# Patient Record
Sex: FEMALE | Race: BLACK OR AFRICAN AMERICAN
[De-identification: names, ages, dates, MRNs, and addresses within clinical notes are randomized per-mention and may not be internally consistent; named-entity substitution may affect disease eponyms.]

---

## 2019-03-15 ENCOUNTER — HOSPITAL ENCOUNTER (EMERGENCY)
Dept: HOSPITAL 62 - ER | Age: 19
Discharge: HOME | End: 2019-03-15
Payer: COMMERCIAL

## 2019-03-15 VITALS — DIASTOLIC BLOOD PRESSURE: 86 MMHG | SYSTOLIC BLOOD PRESSURE: 123 MMHG

## 2019-03-15 DIAGNOSIS — K62.5: ICD-10-CM

## 2019-03-15 DIAGNOSIS — K64.8: Primary | ICD-10-CM

## 2019-03-15 PROCEDURE — 99282 EMERGENCY DEPT VISIT SF MDM: CPT

## 2019-03-15 NOTE — ER DOCUMENT REPORT
ED General





- General


Chief Complaint: Rectal Bleeding


Stated Complaint: BLOOD IN STOOL


Time Seen by Provider: 03/15/19 17:01


TRAVEL OUTSIDE OF THE [place] IN LAST 30 DAYS: No





- HPI


Notes: 





Patient is a 19-year-old female that presents to the emergency department for 

chief complaint of hemorrhoid.


Patient states she noticed a bump on her rectum yesterday.  She denies any pain 

or bleeding in the area.  She did have a vaginal delivery last year and states 

she had similar issues with bleeding after her delivery.  She states she does 

have bowel movements almost every day but they are firm.  She denies any recent 

constipation or diarrhea.  She denies any abdominal pain, fever, nausea and 

vomiting.  Patient states she is concerned because she looked on [employer] and 

wants to make sure it is not a mass.





Past Medical History: Negative





Past Surgical History: Negative





Social History: Reviewed in chart





Family History: Reviewed and noncontributory for presenting illness





Allergies: Reviewed, see documented allergy list.








REVIEW OF SYSTEMS:





CONSTITUTIONAL : 





No fever





No chills





No diaphoresis





No recent illness





EENT:





No vision changes





No congestion





No sore throat  





CARDIOVASCULAR:





No chest pain





No palpitations





RESPIRATORY:





No shortness of breath





No cough





No difficulty breathing





GASTROINTESTINAL: 





No abdominal pain





No nausea





No vomiting





No diarrhea





GENITOURINARY:





No dysuria





No hematuria





No difficulty urinating





Rectal lump





MUSCULOSKELETAL:





No back pain





No leg pain





No arm pain





SKIN:  





No rashes





No lesions





LYMPHATIC: 





No swollen, enlarged glands.





NEUROLOGICAL: 





No lightheadedness





No headache





No weakness





No paresthesias





PSYCHIATRIC:





No anxiety





No depression 








PHYSICAL EXAMINATION:





Vital signs reviewed, nursing noted reviewed.





GENERAL: Well-appearing, well-nourished and in no acute distress.





HEAD: Atraumatic, normocephalic.





EYES: Eyes appear normal, extraocular movements intact, sclera anicteric, 

conjunctiva are normal.





ENT: nares patent, oropharynx clear without exudates.  Moist mucous membranes.





NECK: Normal range of motion, supple without lymphadenopathy





LUNGS: Breath sounds clear to auscultation bilaterally and equal.  No wheezes 

rales or rhonchi.





HEART: Regular rate and rhythm without murmurs





ABDOMEN: Soft, nontender, normoactive bowel sounds.  No rebound, guarding, or 

rigidity. No masses appreciated.





: Small nonthrombosed nonbleeding external hemorrhoid with minimal tenderness 

to palpation.  Normal rectal tone.





EXTREMITIES: Nontender, good range of motion, no pitting or edema. 





NEUROLOGICAL: No focal neurological deficits. Moves all extremities 

spontaneously Motor and sensory grossly intact on exam.





PSYCH: Normal mood, normal affect.





SKIN: Warm, Dry, normal turgor, no rashes or lesions noted on exposed skin











- Related Data


Allergies/Adverse Reactions: 


                                        





ibuprofen Allergy (Verified 03/15/19 13:59)


   


Penicillins Allergy (Verified 03/15/19 13:59)


   


fish Allergy (Uncoded 03/15/19 13:59)


   











Past Medical History





- Social History


Smoking Status: Never Smoker


Family History: Reviewed & Not Pertinent





Physical Exam





- Vital signs


Vitals: 





                                        











Temp Pulse Resp BP Pulse Ox


 


 99.2 F   93   16   120/81   100 


 


 03/15/19 14:15  03/15/19 14:15  03/15/19 14:15  03/15/19 14:15  03/15/19 14:15














Course





- Re-evaluation


Re-evalutation: 





03/15/19 17:10


Vitals reviewed.  Nursing notes reviewed.  Patient has a small nonbleeding and 

nonthrombosed external hemorrhoid.  I counseled her on sits baths as well as 

over-the-counter hemorrhoid creams.  She will be started on Colace.  She was 

counseled on return precautions and care if the area begins to bleed.  She is 

stable at discharge.





- Vital Signs


Vital signs: 





                                        











Temp Pulse Resp BP Pulse Ox


 


 99.2 F   93   16   120/81   100 


 


 03/15/19 14:15  03/15/19 14:15  03/15/19 14:15  03/15/19 14:15  03/15/19 14:15














Discharge





- Discharge


Clinical Impression: 


Hemorrhoid


Qualifiers:


 Hemorrhoid type: other Qualified Code(s): K64.8 - Other hemorrhoids





Condition: Stable


Disposition: HOME, SELF-CARE


Instructions:  Hemorrhoids ([facility])


Additional Instructions: 


Please return to the emergency department if you have any worsening, or concern 

of your symptoms.





Please return to the emergency department if you develop chest pain, difficulty 

breathing, severe abdominal pain, or ongoing vomiting.





Please follow-up with your primary care physician in 2-3 days and any other 

recommended physicians.





If prescribed, take all medications as directed. 





If you have any questions or concerns do not hesitate to return the emergency 

department for evaluation.





Return to the emergency room if your hemorrhoid becomes firm and has more pain





Take warm baths for 15 minutes at a time to soak the hemorrhoid and warm water 

twice daily





Use a stool softener to obtain a soft bowel movement to prevent bleeding





If the area bleeds hold pressure on the area until the bleeding stops





Use over-the-counter preparation H or name brand hemorrhoid cream to help shrink

the hemorrhoid.





Prescriptions: 


Docusate Sodium [Colace] 100 mg PO DAILY #30 capsule


Referrals: 


[facility] [Provider Group] - Follow up as needed

## 2019-10-11 ENCOUNTER — HOSPITAL ENCOUNTER (EMERGENCY)
Dept: HOSPITAL 62 - ER | Age: 19
Discharge: HOME | End: 2019-10-11
Payer: COMMERCIAL

## 2019-10-11 VITALS — SYSTOLIC BLOOD PRESSURE: 128 MMHG | DIASTOLIC BLOOD PRESSURE: 88 MMHG

## 2019-10-11 DIAGNOSIS — N76.0: Primary | ICD-10-CM

## 2019-10-11 DIAGNOSIS — B96.89: ICD-10-CM

## 2019-10-11 LAB
APPEARANCE UR: CLEAR
APTT PPP: YELLOW S
BACTERIA (WET MOUNT): (no result)
BILIRUB UR QL STRIP: NEGATIVE
CHLAM PCR: NOT DETECTED
EPITHELIALS (WET MOUNT): (no result)
GLUCOSE UR STRIP-MCNC: NEGATIVE MG/DL
KETONES UR STRIP-MCNC: NEGATIVE MG/DL
NITRITE UR QL STRIP: NEGATIVE
PH UR STRIP: 7 [PH] (ref 5–9)
PROT UR STRIP-MCNC: NEGATIVE MG/DL
RBCS (WET MOUNT): (no result)
SP GR UR STRIP: 1.01
T.VAGINALIS (WET MOUNT): (no result)
UROBILINOGEN UR-MCNC: NEGATIVE MG/DL (ref ?–2)
WBCS (WET MOUNT): (no result)
YEAST (WET MOUNT): (no result)

## 2019-10-11 PROCEDURE — 81001 URINALYSIS AUTO W/SCOPE: CPT

## 2019-10-11 PROCEDURE — 87210 SMEAR WET MOUNT SALINE/INK: CPT

## 2019-10-11 PROCEDURE — 87591 N.GONORRHOEAE DNA AMP PROB: CPT

## 2019-10-11 PROCEDURE — 99283 EMERGENCY DEPT VISIT LOW MDM: CPT

## 2019-10-11 PROCEDURE — 87491 CHLMYD TRACH DNA AMP PROBE: CPT

## 2019-10-11 NOTE — ER DOCUMENT REPORT
HPI





- HPI


Patient complains to provider of: Vaginal irritation


Time Seen by Provider: 10/11/19 18:48


Onset: Other - A few days


Quality of pain: No pain


Pain Level: Denies


Context: 





19-year-old female with no reported history reports that her vaginal area is red

and swollen.  She reports she may be having an allergic reaction to a new 

detergent she is using.  She does admit to have vaginal discharge that is white.

 Denies itchiness.  Denies vomiting fever diarrhea.  Denies pain with void.  

Denies vaginal bleeding.  Reports she is not sexually active.  Last menstrual 

period was in September.


Associated Symptoms: None


Exacerbated by: Denies


Relieved by: Denies


Similar symptoms previously: No


Recently seen / treated by doctor: No





- CONSTITUTIONAL


Constitutional: DENIES: Fever, Chills





- REPRODUCTIVE


Reproductive: DENIES: Pregnant:





Past Medical History





- General


Information source: Patient


Last Menstrual Period: September





- Social History


Smoking Status: Never Smoker


Cigarette use (# per day): No


Frequency of alcohol use: Rare


Drug Abuse: None


Family History: Reviewed & Not Pertinent


Patient has suicidal ideation: No


Patient has homicidal ideation: No





- Medical History


Medical History: Negative


Renal/ Medical History: Denies: Hx Peritoneal Dialysis


Surgical Hx: Negative





Vertical Provider Document





- CONSTITUTIONAL


Agree With Documented VS: Yes


Exam Limitations: No Limitations


General Appearance: WD/WN, No Apparent Distress





- INFECTION CONTROL


TRAVEL OUTSIDE OF THE [place] IN LAST 30 DAYS: No





- HEENT


HEENT: Atraumatic, Normocephalic





- NECK


Neck: Normal Inspection, Supple





- RESPIRATORY


Respiratory: Breath Sounds Normal, No Respiratory Distress





- CARDIOVASCULAR


Cardiovascular: Regular Rate, Regular Rhythm





- GI/ABDOMEN


Gastrointestinal: Abdomen Soft, Abdomen Non-Tender





- REPRODUCTIVE


Female Genitalia: Normal Inspection - No erythema no swelling no open sores





- BACK


Back: Normal Inspection





- MUSCULOSKELETAL/EXTREMETIES


Musculoskeletal/Extremeties: MAEW, FROM, Non-Tender





- NEURO


Level of Consciousness: Awake, Alert, Appropriate


Motor/Sensory: No Motor Deficit





- DERM


Integumentary: Warm, Dry





Course





- Re-evaluation


Re-evalutation: 





10/11/19 19:10


19-year-old female that presents emergency department with vaginal irritation.  

Reports she is not sexually active.  Reports some vaginal discharge that is 

white.  She is concerned that she may be having allergic reaction to her new 

detergent.  Denies fever vomiting diarrhea.  Denies pain with void.  Denies 

history of STDs.


10/11/19 19:41


UA shows leukocytes, few WBCs.  Patient denies dysuria denies pain with voiding 

denies urinary frequency.  Wet mount shows bacterial vaginosis no yeast.  

Patient reports there is no way she can have STD she is not sexually active.  

Will discharge patient with prescription for Flagyl and instructed to follow-up 

with her GYN or the health department within the next week for recheck.  We will

also contact patient for treatment should her STD cultures come back positive.


10/11/19 21:02


STD cultures negative





- Vital Signs


Vital signs: 


                                        











Temp Pulse Resp BP Pulse Ox


 


 98.3 F   84   18   130/89 H  100 


 


 10/11/19 18:12  10/11/19 18:12  10/11/19 18:12  10/11/19 18:12  10/11/19 18:12














Procedures





- Pelvic Exam


  ** Pelvic exam


Time completed: 19:10


Cultures obtained: Yes


Wet prep obtained: Yes


Herpes culture obtained: No


POC sent to lab: No


Foreign body removed: No


Bimanual exam performed: Yes


Witnessed by: Mariano PORTER





Discharge





- Discharge


Clinical Impression: 


 Vaginal irritation, Bacterial vaginosis





Condition: Stable


Disposition: HOME, SELF-CARE


Instructions:  Metronidazole ([facility]), [facility], Vaginosis, 

Bacterial ([facility])


Additional Instructions: 


*You have been evaluated for vaginal irritation, bacterial vaginosis


*Your wet mount shows a bacterial vaginosis.*Take medication as prescribed-

Flagyl, do not drink alcohol with this medication that will make you extremely 

ill


*STD cultures are pending.  You will be contacted should you need further 

treatment.


*Follow up with your OB/GYN or the health department for recheck within 1 week


*Avoid sexual intercourse until follow up


*Return to ED for worsening condition, changes, needs














Monitor your blood pressure. Your blood pressure was elevated today.  This may 

be because you were anxious, in pain or because you need medication.  It is 

important to follow up with your primary care provider for full evaluation.


Prescriptions: 


Metronidazole [Flagyl 500 mg Tablet] 500 mg PO BID #14 tablet


Forms:  Elevated Blood Pressure